# Patient Record
Sex: MALE
[De-identification: names, ages, dates, MRNs, and addresses within clinical notes are randomized per-mention and may not be internally consistent; named-entity substitution may affect disease eponyms.]

---

## 2019-09-27 ENCOUNTER — HOSPITAL ENCOUNTER (EMERGENCY)
Dept: HOSPITAL 25 - UCEAST | Age: 39
Discharge: HOME | End: 2019-09-27
Payer: SELF-PAY

## 2019-09-27 VITALS — SYSTOLIC BLOOD PRESSURE: 109 MMHG | DIASTOLIC BLOOD PRESSURE: 68 MMHG

## 2019-09-27 DIAGNOSIS — K13.70: Primary | ICD-10-CM

## 2019-09-27 PROCEDURE — 99211 OFF/OP EST MAY X REQ PHY/QHP: CPT

## 2019-09-27 PROCEDURE — G0463 HOSPITAL OUTPT CLINIC VISIT: HCPCS

## 2019-09-27 NOTE — UC
Skin Complaint HPI





- HPI Summary


HPI Summary: 





 THIS MORNING AFTER BRUSHING HIS TEETH PATIENT NOTICED THAT HE WAS 

BLEEDING FROM THE MIDDLE OF HIS TONGUE.  STATES THAT HE HAS NOT STOPPED OOZING 

BLOOD SINCE THEN.  DENIES ANY TRAUMA.  NO CURRENT OR PAST HISTORY OF TONGUE 

PIERCING.  DENIES SMOKING OR ANY TOBACCO PRODUCT USE.  DENIES BEING A HEAVY 

DRINKER.  STATES THE ONLY FAMILY HISTORY OF CANCERS PROSTATE CANCER IN HIS 

FATHER.





- History of Current Complaint


Chief Complaint: UCDentalProblem


Time Seen by Provider: 09/27/19 11:54


Stated Complaint: MOUTH COMPLAINT


Hx Obtained From: Patient


Onset/Duration: Sudden Onset, Lasting Hours, Still Present


Timing: Constant


Onset Severity: Mild


Current Severity: Mild


Pain Intensity: 0


Pain Scale Used: 0-10 Numeric


Character: Raised


Aggravating Factor(s): Nothing


Alleviating Factor(s): Nothing


Associated Signs & Symptoms: Negative: Nausea, Fever, Hoarseness, Throat 

Tightening, Tenderness, Joint Swelling





- Allergy/Home Medications


Allergies/Adverse Reactions: 


 Allergies











Allergy/AdvReac Type Severity Reaction Status Date / Time


 


No Known Allergies Allergy   Verified 09/27/19 11:35











Home Medications: 


 Home Medications





NK [No Home Medications Reported]  09/27/19 [History Confirmed 09/27/19]











PMH/Surg Hx/FS Hx/Imm Hx


Previously Healthy: Yes





- Surgical History


Surgical History: Yes


Surgery Procedure, Year, and Place: Tonsilectomy





- Family History


Known Family History: Positive: Non-Contributory





- Social History


Alcohol Use: Occasionally


Substance Use Type: None


Smoking Status (MU): Never Smoked Tobacco





Review of Systems


All Other Systems Reviewed And Are Negative: Yes


Constitutional: Positive: Negative


ENT: Positive: Other - BLEEDING TONGUE LESION


Respiratory: Positive: Negative


Cardiovascular: Positive: Negative


Gastrointestinal: Positive: Negative





Physical Exam


Triage Information Reviewed: Yes


Appearance: Well-Appearing, No Pain Distress, Well-Nourished


Vital Signs: 


 Initial Vital Signs











Temp  97.8 F   09/27/19 11:31


 


Pulse  60   09/27/19 11:31


 


Resp  16   09/27/19 11:31


 


BP  109/68   09/27/19 11:31


 


Pulse Ox  100   09/27/19 11:31











Vital Signs Reviewed: Yes


Eyes: Positive: Conjunctiva Clear


ENT: Positive: Hearing grossly normal, Pharynx normal, Other - 2.5CM X 1.5CM 

FIRM, PAINLESS LESION WITHIN THE LEFT SIDE OF TONGUE. 1CM OF WHITISH 

DISCOLORATION IN THE CENTER WITH A CENTRALLY LOCATED PUNCTUM THAT IS SLOWLY 

OOZING BRIGHT RED BLOOD.


Neck: Positive: Supple, Nontender, No Lymphadenopathy


Respiratory: Positive: No respiratory distress, No accessory muscle use


Cardiovascular: Positive: Pulses Normal


Abdomen Description: Positive: Soft


Musculoskeletal: Positive: No Edema


Neurological: Positive: Alert


Psychological: Positive: Age Appropriate Behavior


Skin: Negative: Rashes





Course/Dx





- Course


Course Of Treatment: 





PATIENT HAS A FIRM, PAINLESS LESION ON THE LEFT SIDE OF HIS TONGUE THAT IS 

OOZING BLOOD.  I AM CONCERNED ABOUT CANCER ALTHOUGH HE DOES NOT SEEM TO HAVE 

ANY OBVIOUS RISK FACTORS. NON SMOKER. NO H/O TOBACCO USE. NO PIERCINGS. NO ETOH 

ABUSE. ONLY FAMILY H/O CANCER IS PROSTATE CANCER IN DAD.  NO LYMPHADENOPATHY.  

NO UNEXPLAINED WEIGHT LOSS, NIGHT SWEATS OR EASY BRUISING.





I CALLED ENT IN [place] AND [place] AND BOTH OFFICES ARE CLOSED FOR LUNCH.  I 

CALLED [facility] [place] [facility] WHICH ARE ALSO 

CLOSED.  I CALLED [facility] AND WAS ADVISED THAT THEY DO NOT TAKE CARE 

OF TONGUE LESIONS.  I CALLED ORAL SURGERY.  DR. HARRELL IS IN THE OR ALL DAY 

AND DR. PARRISH'S OFFICE CLOSED AT NOON TODAY.  I DISCUSSED WITH THE PATIENT 

THE OPTION OF ATTEMPTING TO CAUTERIZE THE BLEEDING WITH SILVER NITRATE AND 

WAITING UNTIL THE ENT OFFICES REOPEN AFTER LUNCH AT WHICH TIME I COULD CALL TO 

TRY TO GET HIM TO BE SEEN TODAY.  PATIENT DOES NOT HAVE INSURANCE AND IS 

CONCERNED ABOUT COST.  HE OPTS TO WAIT AND SEE IF THE BLEEDING WILL STOP ON ITS 

OWN AND WILL CALL ENT LATER TODAY TO SCHEDULE FOLLOW-UP APPOINTMENT.  I 

STRESSED TO HIM THE IMPORTANCE OF HAVING THIS LESION EVALUATED.  I AGAIN 

OFFERED TO THE PATIENT THE OPTION OF WAITING HERE IN THE URGENT CARE UNTIL THE 

ENT OFFICES REOPEN.  PATIENT DECLINES STATING HE WOULD RATHER CALL ON HIS OWN 

LATER.





- Diagnoses


Provider Diagnosis: 


 Tongue lesion








Discharge ED





- Sign-Out/Discharge


Documenting (check all that apply): Patient Departure


All imaging exams completed and their final reports reviewed: No Studies





- Discharge Plan


Condition: Stable


Disposition: HOME


Referrals: 


[facility] [Outside] - If Needed


Additional Instructions: 


YOU HAVE A PAINLESS BLEEDING LESION IN YOUR TONGUE.  THIS WARRANTS FURTHER 

EVALUATION.  CALL ENT TODAY TO SCHEDULE AN APPOINTMENT ASAP.  I'VE ALSO 

PROVIDED YOU WITH CONTACT INFORMATION FOR THE LOCAL DERMATOLOGISTS AND ORAL 

SURGEONS IF NEEDED.





GO TO THE ER WITHOUT FAIL IF YOUR BLEEDING PERSISTS OR WORSENS OR IF YOU 

DEVELOP FEVER, PAIN, CONTINUED SWELLING OR ANY OTHER CONCERNING SYMPTOMS.








[facility] IN [place]


DRS. STROMINGER, CRYER AND LIZ


[address]


[phone]





ENT IN [place] ([place] OFFICE HOURS ON TUESDAYS)


DR. AGA DIAZ


Address: [address] [place] [id number] 


         [address] [facility] (Tuesdays)


Phone [phone] 


Phone [place]: [phone]





ORAL SURGERY:


[facility]


Thai Oakes


[address] [place] [address] 


Tel: [phone]





[facility]


Jack Parrish Jr., D.D.S.


[address], [address] 


[place] [address] [phone] 





[facility]


Zaheer Antonio, DDS


[address]


[place] [address] 


[phone] 





DERMATOLOGY IN [place]:


DR. LEIGH ANN KELLY (DOES NOT SEE PTS ON MONDAYS OR TUESDAYS)


[facility]


 [address]; [address]


 [place] [address] 


Phone: [phone] 





Dr. Ria Villagomez


Address: [facility] [address] 


[place] [address]


Phone: [phone]





DR. ANNE DAVILA, DR. ROSE MARIE TAN


[facility]


[address], [address]


 [place] [address] [phone] 





- Billing Disposition and Condition


Condition: STABLE


Disposition: Home